# Patient Record
Sex: MALE | HISPANIC OR LATINO | Employment: FULL TIME | URBAN - METROPOLITAN AREA
[De-identification: names, ages, dates, MRNs, and addresses within clinical notes are randomized per-mention and may not be internally consistent; named-entity substitution may affect disease eponyms.]

---

## 2017-06-19 ENCOUNTER — OFFICE VISIT (OUTPATIENT)
Dept: URGENT CARE | Facility: URGENT CARE | Age: 32
End: 2017-06-19
Payer: COMMERCIAL

## 2017-06-19 VITALS
WEIGHT: 160 LBS | SYSTOLIC BLOOD PRESSURE: 141 MMHG | HEART RATE: 74 BPM | OXYGEN SATURATION: 99 % | DIASTOLIC BLOOD PRESSURE: 97 MMHG | TEMPERATURE: 97.8 F

## 2017-06-19 DIAGNOSIS — K60.2 ANAL FISSURE: Primary | ICD-10-CM

## 2017-06-19 DIAGNOSIS — R30.0 DYSURIA: ICD-10-CM

## 2017-06-19 LAB
ALBUMIN UR-MCNC: NEGATIVE MG/DL
APPEARANCE UR: CLEAR
BACTERIA #/AREA URNS HPF: ABNORMAL /HPF
BILIRUB UR QL STRIP: NEGATIVE
COLOR UR AUTO: YELLOW
GLUCOSE UR STRIP-MCNC: NEGATIVE MG/DL
HGB UR QL STRIP: NEGATIVE
KETONES UR STRIP-MCNC: NEGATIVE MG/DL
LEUKOCYTE ESTERASE UR QL STRIP: NEGATIVE
MUCOUS THREADS #/AREA URNS LPF: PRESENT /LPF
NITRATE UR QL: NEGATIVE
NON-SQ EPI CELLS #/AREA URNS LPF: ABNORMAL /LPF
PH UR STRIP: 6 PH (ref 5–7)
RBC #/AREA URNS AUTO: ABNORMAL /HPF (ref 0–2)
SP GR UR STRIP: 1.02 (ref 1–1.03)
URN SPEC COLLECT METH UR: ABNORMAL
UROBILINOGEN UR STRIP-ACNC: 1 EU/DL (ref 0.2–1)
WBC #/AREA URNS AUTO: ABNORMAL /HPF (ref 0–2)

## 2017-06-19 PROCEDURE — 99213 OFFICE O/P EST LOW 20 MIN: CPT | Performed by: PHYSICIAN ASSISTANT

## 2017-06-19 PROCEDURE — 81001 URINALYSIS AUTO W/SCOPE: CPT | Performed by: PHYSICIAN ASSISTANT

## 2017-06-19 NOTE — PROGRESS NOTES
SUBJECTIVE:                                                    Chas Frederick is a 32 year old male who presents to clinic today for the following health issues:      Abdominal Problem      Duration: 3 days    Description (location/character/radiation): blood in stool       Associated flank pain: None    Intensity:  mild    Accompanying signs and symptoms:        Fever/Chills: no        Gas/Bloating: no        Nausea/vomitting: no        Diarrhea: no        Dysuria or Hematuria: no     History (previous similar pain/trauma/previous testing): Had similar problem once in past.     Precipitating or alleviating factors:       Pain worse with eating/BM/urination:        Pain relieved by BM: no     Therapies tried and outcome: None    LMP:  not applicable      Told the  he was here for dysuria but actually here for blood in stool x 3 days with BM. Here with his male partner. No fever. No FHX colon CA. Denies constipation.    No Known Allergies    No past medical history on file.      No current outpatient prescriptions on file prior to visit.  No current facility-administered medications on file prior to visit.     Social History   Substance Use Topics     Smoking status: Never Smoker     Smokeless tobacco: Not on file     Alcohol use Not on file       ROS:  General: negative for fever  ABD: Denies abd pain  : as above    OBJECTIVE:  BP (!) 141/97 (BP Location: Left arm, Patient Position: Chair, Cuff Size: Adult Regular)  Pulse 74  Temp 97.8  F (36.6  C) (Oral)  Wt 160 lb (72.6 kg)  SpO2 99%   General:   awake, alert, and cooperative.  NAD.   Head: Normocephalic, atraumatic.  Eyes: Conjunctiva clear, non icteric.   ABD: soft, no tenderness to palpation , no rigidity, guarding or rebound . No CVAT  Neuro: Alert and oriented - normal speech.   Rectum-4mm anal fissure at 9 o'clock, very tender, mildly bleeding  Results for orders placed or performed in visit on 06/19/17   UA with Microscopic reflex to  Culture   Result Value Ref Range    Color Urine Yellow     Appearance Urine Clear     Glucose Urine Negative NEG mg/dL    Bilirubin Urine Negative NEG    Ketones Urine Negative NEG mg/dL    Specific Gravity Urine 1.025 1.003 - 1.035    pH Urine 6.0 5.0 - 7.0 pH    Protein Albumin Urine Negative NEG mg/dL    Urobilinogen Urine 1.0 0.2 - 1.0 EU/dL    Nitrite Urine Negative NEG    Blood Urine Negative NEG    Leukocyte Esterase Urine Negative NEG    Source Midstream Urine     WBC Urine O - 2 0 - 2 /HPF    RBC Urine O - 2 0 - 2 /HPF    Squamous Epithelial /LPF Urine Few FEW /LPF    Bacteria Urine Few (A) NEG /HPF    Mucous Urine Present (A) NEG /LPF       ASSESSMENT:      ICD-10-CM    1. Anal fissure K60.2 hydrocortisone (ANUSOL-HC) 2.5 % cream     GASTROENTEROLOGY ADULT REF PROCEDURE ONLY   2. Dysuria R30.0            PLAN: Increase water. Recticare prn. Warm tub soaks. Possible colonoscopy to make sure nothing more is going on.  As per ordered above.   Follow up with primary care physician if not improving.  Advised about symptoms which might herald more serious problems.      Karen Cuellar PA-C

## 2017-06-19 NOTE — MR AVS SNAPSHOT
After Visit Summary   6/19/2017    Chas Frederick    MRN: [id number]           Patient Information     Date Of Birth          1985        Visit Information        Provider Department      6/19/2017 3:25 PM Karen Cuellar PA-C [facility]        Today's Diagnoses     Anal fissure    -  1    Dysuria          Care Instructions    Warm tub soaks  Recticare numbing agent          Follow-ups after your visit        Additional Services     GASTROENTEROLOGY ADULT REF PROCEDURE ONLY       Last Lab Result: Creatinine (mg/dL)       Date                     Value                 06/28/2005               0.89             ----------  There is no height or weight on file to calculate BMI.      Patient will be contacted to schedule procedure.     Please be aware that coverage of these services is subject to the terms and limitations of your health insurance plan.  Call member services at your health plan with any benefit or coverage questions.  Any procedures must be performed at a [facility] facility OR coordinated by your clinic's referral office.    Please bring the following with you to your appointment:    (1) Any X-Rays, CTs or MRIs which have been performed.  Contact the facility where they were done to arrange for  prior to your scheduled appointment.    (2) List of current medications   (3) This referral request   (4) Any documents/labs given to you for this referral                  Who to contact     If you have questions or need follow up information about today's clinic visit or your schedule please contact [facility] directly at [phone].  Normal or non-critical lab and imaging results will be communicated to you by MyChart, letter or phone within 4 business days after the clinic has received the results. If you do not hear from us within 7 days, please contact the clinic through MyChart or phone. If you have a critical or abnormal lab result, we  "will notify you by phone as soon as possible.  Submit refill requests through [employer] ( [employer] ) or call your pharmacy and they will forward the refill request to us. Please allow 3 business days for your refill to be completed.          Additional Information About Your Visit        [email or web address] Information     [employer] ( [employer] ) lets you send messages to your doctor, view your test results, renew your prescriptions, schedule appointments and more. To sign up, go to [email or web address] ( [employer] ) . Click on \"Log in\" on the left side of the screen, which will take you to the Welcome page. Then click on \"Sign up Now\" on the right side of the page.     You will be asked to enter the access code listed below, as well as some personal information. Please follow the directions to create your username and password.     Your access code is: [email or web address]  Expires: 2017  4:34 PM     Your access code will  in 90 days. If you need help or a new code, please call your [place] clinic or [phone].        Care EveryWhere ID     This is your Care EveryWhere ID. This could be used by other organizations to access your [facility] medical records  [id number]        Your Vitals Were     Pulse Temperature Pulse Oximetry             74 97.8  F (36.6  C) (Oral) 99%          Blood Pressure from Last 3 Encounters:   17 (!) 141/97    Weight from Last 3 Encounters:   17 160 lb (72.6 kg)              We Performed the Following     GASTROENTEROLOGY ADULT REF PROCEDURE ONLY     UA with Microscopic reflex to Culture          Today's Medication Changes          These changes are accurate as of: 17  4:34 PM.  If you have any questions, ask your nurse or doctor.               Start taking these medicines.        Dose/Directions    hydrocortisone 2.5 % cream   Commonly known as:  ANUSOL-HC   Used for:  Anal fissure   Started by:  Karen Cuellar PA-C        Place rectally 2 times daily   Quantity:  30 g   Refills:  0            Where to get your " medicines      These medications were sent to [facility] [id number] [facility] [place]  [address]  [address] [place] [address]     Phone:  [phone]     hydrocortisone 2.5 % cream                Primary Care Provider    None Specified       No primary provider on file.        Thank you!     Thank you for choosing [facility]  for your care. Our goal is always to provide you with excellent care. Hearing back from our patients is one way we can continue to improve our services. Please take a few minutes to complete the written survey that you may receive in the mail after your visit with us. Thank you!             Your Updated Medication List - Protect others around you: Learn how to safely use, store and throw away your medicines at www.disposemymeds.org.          This list is accurate as of: 6/19/17  4:34 PM.  Always use your most recent med list.                   Brand Name Dispense Instructions for use    hydrocortisone 2.5 % cream    ANUSOL-HC    30 g    Place rectally 2 times daily

## 2017-06-19 NOTE — NURSING NOTE
Chief Complaint   Patient presents with     Rectal Problem     Pt c/o blood in stool for 3 days.       Initial BP (!) 141/97 (BP Location: Left arm, Patient Position: Chair, Cuff Size: Adult Regular)  Pulse 74  Temp 97.8  F (36.6  C) (Oral)  Wt 160 lb (72.6 kg)  SpO2 99% There is no height or weight on file to calculate BMI.  Medication Reconciliation: complete     Alejandra Pham CMA (AAMA)

## 2024-10-22 ENCOUNTER — APPOINTMENT (OUTPATIENT)
Dept: ULTRASOUND IMAGING | Facility: CLINIC | Age: 39
End: 2024-10-22
Attending: EMERGENCY MEDICINE
Payer: COMMERCIAL

## 2024-10-22 ENCOUNTER — HOSPITAL ENCOUNTER (EMERGENCY)
Facility: CLINIC | Age: 39
Discharge: HOME | End: 2024-10-22
Attending: EMERGENCY MEDICINE | Admitting: EMERGENCY MEDICINE
Payer: COMMERCIAL

## 2024-10-22 VITALS
HEART RATE: 98 BPM | HEIGHT: 67 IN | OXYGEN SATURATION: 98 % | SYSTOLIC BLOOD PRESSURE: 173 MMHG | TEMPERATURE: 97.8 F | DIASTOLIC BLOOD PRESSURE: 122 MMHG | WEIGHT: 160 LBS | RESPIRATION RATE: 20 BRPM | BODY MASS INDEX: 25.11 KG/M2

## 2024-10-22 DIAGNOSIS — N45.2 ORCHITIS: ICD-10-CM

## 2024-10-22 PROCEDURE — 99285 EMERGENCY DEPT VISIT HI MDM: CPT | Mod: 25

## 2024-10-22 PROCEDURE — 250N000013 HC RX MED GY IP 250 OP 250 PS 637: Performed by: EMERGENCY MEDICINE

## 2024-10-22 PROCEDURE — 93976 VASCULAR STUDY: CPT

## 2024-10-22 PROCEDURE — 250N000011 HC RX IP 250 OP 636: Performed by: EMERGENCY MEDICINE

## 2024-10-22 PROCEDURE — 96372 THER/PROPH/DIAG INJ SC/IM: CPT | Performed by: EMERGENCY MEDICINE

## 2024-10-22 PROCEDURE — 250N000009 HC RX 250: Performed by: EMERGENCY MEDICINE

## 2024-10-22 PROCEDURE — 87491 CHLMYD TRACH DNA AMP PROBE: CPT | Performed by: EMERGENCY MEDICINE

## 2024-10-22 RX ORDER — LEVOFLOXACIN 500 MG/1
500 TABLET, FILM COATED ORAL DAILY
Qty: 10 TABLET | Refills: 0 | Status: SHIPPED | OUTPATIENT
Start: 2024-10-22

## 2024-10-22 RX ORDER — ACETAMINOPHEN 500 MG
1000 TABLET ORAL ONCE
Status: COMPLETED | OUTPATIENT
Start: 2024-10-22 | End: 2024-10-22

## 2024-10-22 RX ORDER — DOXYCYCLINE 100 MG/1
100 CAPSULE ORAL 2 TIMES DAILY
Qty: 20 CAPSULE | Refills: 0 | Status: SHIPPED | OUTPATIENT
Start: 2024-10-22 | End: 2024-11-01

## 2024-10-22 RX ADMIN — LIDOCAINE HYDROCHLORIDE 500 MG: 10 INJECTION, SOLUTION EPIDURAL; INFILTRATION; INTRACAUDAL; PERINEURAL at 13:54

## 2024-10-22 RX ADMIN — ACETAMINOPHEN 1000 MG: 500 TABLET ORAL at 13:54

## 2024-10-22 ASSESSMENT — ACTIVITIES OF DAILY LIVING (ADL)
ADLS_ACUITY_SCORE: 35
ADLS_ACUITY_SCORE: 35

## 2024-10-22 NOTE — ED PROVIDER NOTES
"Emergency Department Note      History of Present Illness     Chief Complaint   Testicular/scrotal Pain    HPI   Chas Thomas is a 39 year old male who presents with R testicular pain and swelling x3 days. Sx have been progressive. No dysuria. No fever. No penile discharge. Pt sexually active. He reports getting treated for chlamydia 3 weeks ago after an exposure. Pt did not get tested. He was given a course of unspecified abx x1 week. Pt is sexually active with men, sometimes engages in anal sex.       Past Medical History     Medical History and Problem List   No past medical history on file.    Medications   doxycycline hyclate (VIBRAMYCIN) 100 MG capsule  levofloxacin (LEVAQUIN) 500 MG tablet  hydrocortisone (ANUSOL-HC) 2.5 % cream        Surgical History   No past surgical history on file.    Physical Exam     Patient Vitals for the past 24 hrs:   BP Temp Pulse Resp SpO2 Height Weight   10/22/24 1117 (!) 173/122 97.8  F (36.6  C) 98 20 98 % 1.702 m (5' 7\") 72.6 kg (160 lb)     Physical Exam  Constitutional:       General: He is not in acute distress.     Appearance: Normal appearance. He is not toxic-appearing.   HENT:      Head: Atraumatic.   Eyes:      General: No scleral icterus.     Conjunctiva/sclera: Conjunctivae normal.   Cardiovascular:      Rate and Rhythm: Normal rate.      Heart sounds: Normal heart sounds.   Pulmonary:      Effort: Pulmonary effort is normal. No respiratory distress.      Breath sounds: Normal breath sounds.   Abdominal:      Palpations: Abdomen is soft.      Tenderness: There is no abdominal tenderness.   Musculoskeletal:         General: No deformity.      Cervical back: Neck supple.   Skin:     General: Skin is warm.   Neurological:      Mental Status: He is alert.       VS: Reviewed per above  HENT: Mucous membranes moist  EYES: sclera anicteric  CV: Rate as noted,  regular rhythm.   RESP: Effort normal.   : Right greater than left testicular tenderness.  No  " lesions.  No inguinal masses or tenderness.  NEURO: Alert, moving all extremities  MSK: No deformity of the extremities  SKIN: Warm and dry    Diagnostics     Lab Results   Chlamydia trachomatis/Neisseria gonorrhoeae by PCR    In processCollected 10/22 1317     Imaging   US Testicular & Scrotum w Doppler Ltd   Final Result   IMPRESSION:   1.  Increased color Doppler flow on the right compared to the left,   question right orchitis.   2.  No hernia.      CELSO MILLER MD            SYSTEM ID:  [id number]            ED Course      Medications Administered   Medications   acetaminophen (TYLENOL) tablet 1,000 mg (1,000 mg Oral $Given 10/22/24 135)   cefTRIAXone (ROCEPHIN) 500 mg in lidocaine injection (500 mg Intramuscular $Given 10/22/24 4436)           Medical Decision Making / Diagnosis           ROBERT Thomas is a 39 year old male who presents to the ER for evaluation of right greater than left testicular pain.  Vital signs notable for elevated blood pressure.  Exam reveals right greater than left testicular tenderness.  No  lesions.  Ultrasound reveals signs of right-sided orchitis.  Patient reports recent exposure to chlamydia and treatment with 1 week of unspecified antibiotic.  Plan to check for gonorrhea and chlamydia here and provide empiric treatment with IM Rocephin, oral doxycycline, oral Levaquin due to history of anal sex.  Abstinence recommended until sx resolve and he completes treatment. Return precautions discussed prior to discharge.  Primary care follow  up recommended.    Disposition   The patient was discharged.     Diagnosis     ICD-10-CM    1. Orchitis  N45.2            Discharge Medications   Discharge Medication List as of 10/22/2024  1:57 PM        START taking these medications    Details   doxycycline hyclate (VIBRAMYCIN) 100 MG capsule Take 1 capsule (100 mg) by mouth 2 times daily for 10 days., Disp-20 capsule, R-0, E-Prescribe      levofloxacin (LEVAQUIN) 500 MG  tablet Take 1 tablet (500 mg) by mouth daily., Disp-10 tablet, R-0, E-Prescribe                Frederick Mota MD  10/22/24 3162

## 2024-10-23 ENCOUNTER — HOSPITAL ENCOUNTER (EMERGENCY)
Facility: CLINIC | Age: 39
Discharge: HOME | End: 2024-10-23
Attending: EMERGENCY MEDICINE | Admitting: EMERGENCY MEDICINE
Payer: COMMERCIAL

## 2024-10-23 VITALS
DIASTOLIC BLOOD PRESSURE: 117 MMHG | OXYGEN SATURATION: 100 % | HEART RATE: 90 BPM | RESPIRATION RATE: 18 BRPM | BODY MASS INDEX: 26.36 KG/M2 | TEMPERATURE: 98.2 F | WEIGHT: 164.02 LBS | SYSTOLIC BLOOD PRESSURE: 155 MMHG | HEIGHT: 66 IN

## 2024-10-23 DIAGNOSIS — N45.2 ORCHITIS OF RIGHT TESTICLE: ICD-10-CM

## 2024-10-23 LAB
C TRACH DNA SPEC QL PROBE+SIG AMP: NEGATIVE
N GONORRHOEA DNA SPEC QL NAA+PROBE: NEGATIVE

## 2024-10-23 PROCEDURE — 250N000013 HC RX MED GY IP 250 OP 250 PS 637: Performed by: EMERGENCY MEDICINE

## 2024-10-23 PROCEDURE — 99284 EMERGENCY DEPT VISIT MOD MDM: CPT

## 2024-10-23 RX ORDER — DOXYCYCLINE 100 MG/1
100 CAPSULE ORAL 2 TIMES DAILY
Qty: 28 CAPSULE | Refills: 0 | Status: SHIPPED | OUTPATIENT
Start: 2024-10-23 | End: 2024-11-06

## 2024-10-23 RX ORDER — LEVOFLOXACIN 500 MG/1
500 TABLET, FILM COATED ORAL ONCE
Status: COMPLETED | OUTPATIENT
Start: 2024-10-23 | End: 2024-10-23

## 2024-10-23 RX ORDER — LEVOFLOXACIN 500 MG/1
500 TABLET, FILM COATED ORAL DAILY
Qty: 10 TABLET | Refills: 0 | Status: SHIPPED | OUTPATIENT
Start: 2024-10-23 | End: 2024-11-02

## 2024-10-23 RX ORDER — DOXYCYCLINE 100 MG/1
100 CAPSULE ORAL ONCE
Status: COMPLETED | OUTPATIENT
Start: 2024-10-23 | End: 2024-10-23

## 2024-10-23 RX ADMIN — DOXYCYCLINE HYCLATE 100 MG: 100 CAPSULE ORAL at 15:33

## 2024-10-23 RX ADMIN — LEVOFLOXACIN 500 MG: 500 TABLET, FILM COATED ORAL at 15:33

## 2024-10-23 ASSESSMENT — COLUMBIA-SUICIDE SEVERITY RATING SCALE - C-SSRS
6. HAVE YOU EVER DONE ANYTHING, STARTED TO DO ANYTHING, OR PREPARED TO DO ANYTHING TO END YOUR LIFE?: NO
1. IN THE PAST MONTH, HAVE YOU WISHED YOU WERE DEAD OR WISHED YOU COULD GO TO SLEEP AND NOT WAKE UP?: NO
2. HAVE YOU ACTUALLY HAD ANY THOUGHTS OF KILLING YOURSELF IN THE PAST MONTH?: NO

## 2024-10-23 NOTE — ED PROVIDER NOTES
"  Emergency Department Note      History of Present Illness     Chief Complaint   Groin Swelling    HPI   Chas Thomas is a 39 year old male who presents to the ED for groin swelling. Monday morning, the patient woke up and noticed that his testicles were swollen and painful. The pain is worse with movement and to light touch. He states that everything was per his baseline on Sunday. Yesterday, he was seen in the ED. Ultrasound showed orchitis. He also states that his pain radiates into his lower abdomen. He was prescribed an oral antibiotic; however, when attempting to pick-up his antibiotics, his prescription was lost. Today, he states his testicles are slightly more swollen. He also states that his pain radiates into his lower abdomen. Patient denies any fevers, frequency, or urgency. No lesions, penile discharge, or hematuria.     Independent Historian   None    Review of External Notes   Reviewed [facility] note from 10/22/24. Patient was diagnosed with orchitis via ultrasound. He was prescribed oral antibiotics. Patient did test negative for gonorrhea and chlamydia. Of note, he was treated with antibiotics for exposure to gonorrhea 3 weeks ago.    Past Medical History   Medical History and Problem List   PrEP  Genital herpes    Medications   Acyclovir    Physical Exam   Patient Vitals for the past 24 hrs:   BP Temp Temp src Pulse Resp SpO2 Height Weight   10/23/24 1508 (!) 155/117 98.2  F (36.8  C) Temporal 90 18 100 % 1.676 m (5' 6\") 74.4 kg (164 lb 0.4 oz)     Physical Exam  General: the patient is awake and interactive; well appearing; sitting comfortably in triage chair in no distresss  HEENT:  Moist mucous membranes, conjunctiva normal  Pulmonary:  Normal respiratory effort  Cardiovascular:  Well perfused  Abdomen: Soft, nontender, nondistended. No guarding/rebound.   Musculoskeletal:  Moving 4 extremities grossly wnl, no deformities  Neuro:  Speech normal, no focal deficits  Psych:  Normal " affect     Diagnostics   None  ED Course    Medications Administered   Medications   doxycycline hyclate (VIBRAMYCIN) capsule 100 mg (has no administration in time range)   levofloxacin (LEVAQUIN) tablet 500 mg (has no administration in time range)       Procedures   Procedures     Discussion of Management   None    ED Course   ED Course as of 10/23/24 1527   Wed Oct 23, 2024   1514 I obtained history and examined the patient as noted above.       Additional Documentation  None    Medical Decision Making / Diagnosis   CMS Diagnoses: None    MIPS       None    MDM   39-year-old male presenting to the ER with continued right testicular pain and swelling.  Please see above for the details in the HPI and exam.  He is afebrile vitally stable.  He was seen yesterday in the ER and ultrasound was concerning for orchitis.  There is been no worsening systemic symptoms or pain.  He is here because he has been unable to successfully fill his prescribed antibiotics as they are no longer showing up on his pharmacy kehinde.  He declines repeat imaging or exam which I feel is reasonable given persistence of symptoms and not worsening.  He was given dose of oral antibiotics here and will be sent with paper prescription to fill with his pharmacy. Although negative gonorrhea/chlamydia testing, will empirically treat with oral antibiotics (he got IM Rocephin yesterday) as he was treated for chlamydia exposure 3 weeks ago; we will also start Levaquin due to anal sex exposure.  Doubt torsion or abscess or referred intraabdominal catastrophe requiring further imaging or workup at this time.  He is comfortable with plan of outpatient follow-up to ensure resolution and I discussed the signs/symptoms that should prompt surgery return to the emergency department.  All questions were answered prior to discharge.    Disposition   The patient was discharged.     Diagnosis     ICD-10-CM    1. Orchitis of right testicle  N45.2            Discharge  Medications   New Prescriptions    DOXYCYCLINE HYCLATE (VIBRAMYCIN) 100 MG CAPSULE    Take 1 capsule (100 mg) by mouth 2 times daily for 14 days.    LEVOFLOXACIN (LEVAQUIN) 500 MG TABLET    Take 1 tablet (500 mg) by mouth daily for 10 days.     Scribe Disclosure:  Rikki HORAN, am serving as a scribe at 3:25 PM on 10/23/2024 to document services personally performed by Andrés High MD based on my observations and the provider's statements to me.        Andrés High MD  10/23/24 1526

## 2024-10-23 NOTE — ED TRIAGE NOTES
Pt arrives with complaint of right testicular swelling. At an ED yesterday he was diagnosed with orchitis, instructed to  antibiotics yesterday. Pt had difficulty getting medications and is here because of this. Denies worsening in symptoms since visit. A&Ox4.